# Patient Record
Sex: MALE | Race: ASIAN | NOT HISPANIC OR LATINO | Employment: FULL TIME | URBAN - METROPOLITAN AREA
[De-identification: names, ages, dates, MRNs, and addresses within clinical notes are randomized per-mention and may not be internally consistent; named-entity substitution may affect disease eponyms.]

---

## 2018-08-28 ENCOUNTER — HOSPITAL ENCOUNTER (OUTPATIENT)
Dept: RADIOLOGY | Facility: HOSPITAL | Age: 53
Discharge: HOME | End: 2018-08-28
Attending: INTERNAL MEDICINE
Payer: COMMERCIAL

## 2018-08-28 DIAGNOSIS — R52 PAIN: Primary | ICD-10-CM

## 2018-08-28 DIAGNOSIS — M54.50 LOW BACK PAIN: ICD-10-CM

## 2018-08-28 DIAGNOSIS — R52 PAIN: ICD-10-CM

## 2018-08-28 PROCEDURE — 72040 X-RAY EXAM NECK SPINE 2-3 VW: CPT | Mod: TC,FY

## 2018-08-28 PROCEDURE — 72040 X-RAY EXAM NECK SPINE 2-3 VW: CPT | Mod: 26,,, | Performed by: RADIOLOGY

## 2018-08-28 PROCEDURE — 72100 X-RAY EXAM L-S SPINE 2/3 VWS: CPT | Mod: 26,,, | Performed by: RADIOLOGY

## 2018-08-28 PROCEDURE — 72100 X-RAY EXAM L-S SPINE 2/3 VWS: CPT | Mod: TC,FY

## 2024-11-14 ENCOUNTER — HOSPITAL ENCOUNTER (EMERGENCY)
Facility: HOSPITAL | Age: 59
Discharge: HOME | End: 2024-11-14
Attending: EMERGENCY MEDICINE
Payer: COMMERCIAL

## 2024-11-14 VITALS
TEMPERATURE: 98 F | HEART RATE: 62 BPM | BODY MASS INDEX: 28.32 KG/M2 | RESPIRATION RATE: 20 BRPM | DIASTOLIC BLOOD PRESSURE: 91 MMHG | HEIGHT: 65 IN | OXYGEN SATURATION: 99 % | WEIGHT: 170 LBS | SYSTOLIC BLOOD PRESSURE: 164 MMHG

## 2024-11-14 DIAGNOSIS — D73.5 SPLENIC INFARCT: ICD-10-CM

## 2024-11-14 DIAGNOSIS — R73.9 HYPERGLYCEMIA: Primary | ICD-10-CM

## 2024-11-14 DIAGNOSIS — R11.0 NAUSEA: ICD-10-CM

## 2024-11-14 LAB
ALBUMIN SERPL BCP-MCNC: 4.4 G/DL (ref 3.5–5.2)
ALP SERPL-CCNC: 75 U/L (ref 40–150)
ALT SERPL W/O P-5'-P-CCNC: 34 U/L (ref 10–44)
ANION GAP SERPL CALC-SCNC: 15 MMOL/L (ref 8–16)
AST SERPL-CCNC: 19 U/L (ref 10–40)
B-OH-BUTYR BLD STRIP-SCNC: 0.6 MMOL/L (ref 0–0.5)
BACTERIA #/AREA URNS HPF: NORMAL /HPF
BASOPHILS # BLD AUTO: 0.05 K/UL (ref 0–0.2)
BASOPHILS NFR BLD: 0.8 % (ref 0–1.9)
BILIRUB SERPL-MCNC: 0.6 MG/DL (ref 0.1–1)
BILIRUB UR QL STRIP: NEGATIVE
BUN SERPL-MCNC: 20 MG/DL (ref 6–20)
CALCIUM SERPL-MCNC: 9.8 MG/DL (ref 8.7–10.5)
CHLORIDE SERPL-SCNC: 98 MMOL/L (ref 95–110)
CLARITY UR: CLEAR
CO2 SERPL-SCNC: 24 MMOL/L (ref 23–29)
COLOR UR: COLORLESS
CREAT SERPL-MCNC: 1.2 MG/DL (ref 0.5–1.4)
DIFFERENTIAL METHOD BLD: NORMAL
EOSINOPHIL # BLD AUTO: 0.1 K/UL (ref 0–0.5)
EOSINOPHIL NFR BLD: 1.8 % (ref 0–8)
ERYTHROCYTE [DISTWIDTH] IN BLOOD BY AUTOMATED COUNT: 12.8 % (ref 11.5–14.5)
EST. GFR  (NO RACE VARIABLE): >60 ML/MIN/1.73 M^2
GLUCOSE SERPL-MCNC: 413 MG/DL (ref 70–110)
GLUCOSE UR QL STRIP: ABNORMAL
HCT VFR BLD AUTO: 49.8 % (ref 40–54)
HGB BLD-MCNC: 17.2 G/DL (ref 14–18)
HGB UR QL STRIP: NEGATIVE
IMM GRANULOCYTES # BLD AUTO: 0.03 K/UL (ref 0–0.04)
IMM GRANULOCYTES NFR BLD AUTO: 0.5 % (ref 0–0.5)
KETONES UR QL STRIP: ABNORMAL
LEUKOCYTE ESTERASE UR QL STRIP: NEGATIVE
LIPASE SERPL-CCNC: 42 U/L (ref 4–60)
LYMPHOCYTES # BLD AUTO: 2.7 K/UL (ref 1–4.8)
LYMPHOCYTES NFR BLD: 44.8 % (ref 18–48)
MCH RBC QN AUTO: 29.6 PG (ref 27–31)
MCHC RBC AUTO-ENTMCNC: 34.5 G/DL (ref 32–36)
MCV RBC AUTO: 86 FL (ref 82–98)
MICROSCOPIC COMMENT: NORMAL
MONOCYTES # BLD AUTO: 0.4 K/UL (ref 0.3–1)
MONOCYTES NFR BLD: 7.1 % (ref 4–15)
NEUTROPHILS # BLD AUTO: 2.7 K/UL (ref 1.8–7.7)
NEUTROPHILS NFR BLD: 45 % (ref 38–73)
NITRITE UR QL STRIP: NEGATIVE
NRBC BLD-RTO: 0 /100 WBC
PH UR STRIP: 5 [PH] (ref 5–8)
PLATELET # BLD AUTO: 187 K/UL (ref 150–450)
PMV BLD AUTO: 10.7 FL (ref 9.2–12.9)
POCT GLUCOSE: 363 MG/DL (ref 70–110)
POTASSIUM SERPL-SCNC: 3.8 MMOL/L (ref 3.5–5.1)
PROT SERPL-MCNC: 7.9 G/DL (ref 6–8.4)
PROT UR QL STRIP: ABNORMAL
RBC # BLD AUTO: 5.82 M/UL (ref 4.6–6.2)
SODIUM SERPL-SCNC: 137 MMOL/L (ref 136–145)
SP GR UR STRIP: >1.03 (ref 1–1.03)
URN SPEC COLLECT METH UR: ABNORMAL
UROBILINOGEN UR STRIP-ACNC: NEGATIVE EU/DL
WBC # BLD AUTO: 6.07 K/UL (ref 3.9–12.7)
YEAST URNS QL MICRO: NORMAL

## 2024-11-14 PROCEDURE — 80053 COMPREHEN METABOLIC PANEL: CPT | Performed by: PHYSICIAN ASSISTANT

## 2024-11-14 PROCEDURE — 83690 ASSAY OF LIPASE: CPT | Performed by: PHYSICIAN ASSISTANT

## 2024-11-14 PROCEDURE — 82010 KETONE BODYS QUAN: CPT | Performed by: PHYSICIAN ASSISTANT

## 2024-11-14 PROCEDURE — 96360 HYDRATION IV INFUSION INIT: CPT

## 2024-11-14 PROCEDURE — 82962 GLUCOSE BLOOD TEST: CPT

## 2024-11-14 PROCEDURE — 81000 URINALYSIS NONAUTO W/SCOPE: CPT | Performed by: PHYSICIAN ASSISTANT

## 2024-11-14 PROCEDURE — 25500020 PHARM REV CODE 255: Performed by: EMERGENCY MEDICINE

## 2024-11-14 PROCEDURE — 25000003 PHARM REV CODE 250: Performed by: PHYSICIAN ASSISTANT

## 2024-11-14 PROCEDURE — 99285 EMERGENCY DEPT VISIT HI MDM: CPT | Mod: 25

## 2024-11-14 PROCEDURE — 96361 HYDRATE IV INFUSION ADD-ON: CPT

## 2024-11-14 PROCEDURE — 85025 COMPLETE CBC W/AUTO DIFF WBC: CPT | Performed by: PHYSICIAN ASSISTANT

## 2024-11-14 RX ORDER — RAMIPRIL 10 MG/1
10 CAPSULE ORAL EVERY MORNING
COMMUNITY
Start: 2024-08-02 | End: 2024-11-14

## 2024-11-14 RX ORDER — RAMIPRIL 10 MG/1
10 CAPSULE ORAL DAILY
Qty: 30 CAPSULE | Refills: 0 | Status: SHIPPED | OUTPATIENT
Start: 2024-11-14 | End: 2024-11-19

## 2024-11-14 RX ORDER — METFORMIN HYDROCHLORIDE 500 MG/1
500 TABLET ORAL 2 TIMES DAILY WITH MEALS
Qty: 30 TABLET | Refills: 0 | Status: SHIPPED | OUTPATIENT
Start: 2024-11-14 | End: 2025-11-14

## 2024-11-14 RX ORDER — ONDANSETRON 4 MG/1
4 TABLET, ORALLY DISINTEGRATING ORAL EVERY 6 HOURS PRN
Qty: 30 TABLET | Refills: 0 | Status: SHIPPED | OUTPATIENT
Start: 2024-11-14

## 2024-11-14 RX ADMIN — SODIUM CHLORIDE 1000 ML: 9 INJECTION, SOLUTION INTRAVENOUS at 11:11

## 2024-11-14 RX ADMIN — IOHEXOL 75 ML: 350 INJECTION, SOLUTION INTRAVENOUS at 11:11

## 2024-11-14 NOTE — CONSULTS
[facility] - Emergency Dept  General Surgery  Consult Note    Patient Name: Kris Cordon  MRN: [id number]  Code Status: No Order  Admission Date: 11/14/2024  Hospital Length of Stay: 0 days  Attending Physician: Chaim Patel,*  Primary Care Provider: Willian Purvis MD    Patient information was obtained from patient, past medical records, and ER records.     Consults  Subjective:     Principal Problem: Splenic infarct    History of Present Illness: 59M with hx of DM presents with abdominal pain that started while driving this am. One episode of vomiting but no nausea or vomiting currently. Pain has improved but still persisted. Last BM yesterday. No history of trauma. Work-up revealed normal labs with the exception of a glucose of >400. CT showed areas of splenic infarction. Pt denies history of blood clots. Surgery consulted.     No current facility-administered medications on file prior to encounter.     Current Outpatient Medications on File Prior to Encounter   Medication Sig    ramipriL (ALTACE) 10 MG capsule Take 10 mg by mouth every morning.       Review of patient's allergies indicates:   Allergen Reactions    Ibuprofen        Past Medical History:   Diagnosis Date    Hypertension      History reviewed. No pertinent surgical history.  Family History    None       Tobacco Use    Smoking status: Never    Smokeless tobacco: Never   Substance and Sexual Activity    Alcohol use: Not on file    Drug use: Not on file    Sexual activity: Not on file     Review of Systems   Gastrointestinal:  Positive for abdominal pain and vomiting.     Objective:     Vital Signs (Most Recent):  Temp: 97.5 °F (36.4 °C) (11/14/24 0828)  Pulse: 62 (11/14/24 1232)  Resp: 20 (11/14/24 1002)  BP: (!) 164/91 (11/14/24 1232)  SpO2: 99 % (11/14/24 1232) Vital Signs (24h Range):  Temp:  [97.5 °F (36.4 °C)] 97.5 °F (36.4 °C)  Pulse:  [60-64] 62  Resp:  [20] 20  SpO2:  [97 %-100 %] 99 %  BP: (148-186)/(72-91) 164/91     Weight:  77.1 kg (170 lb)  Body mass index is 28.29 kg/m².     Physical Exam  Vitals reviewed.   Constitutional:       General: He is not in acute distress.  HENT:      Head: Normocephalic and atraumatic.      Nose: Nose normal.   Eyes:      General:         Right eye: No discharge.         Left eye: No discharge.   Cardiovascular:      Rate and Rhythm: Normal rate and regular rhythm.   Pulmonary:      Effort: Pulmonary effort is normal. No respiratory distress.      Breath sounds: No stridor.   Abdominal:      Comments: Soft, ND, minimal tenderness to palpation    Musculoskeletal:         General: Normal range of motion.      Cervical back: Normal range of motion.   Skin:     General: Skin is warm and dry.      Capillary Refill: Capillary refill takes 2 to 3 seconds.   Neurological:      General: No focal deficit present.      Mental Status: He is alert and oriented to person, place, and time.   Psychiatric:         Mood and Affect: Mood normal.         Behavior: Behavior normal.            I have reviewed all pertinent lab results within the past 24 hours.    Significant Diagnostics:  I have reviewed all pertinent imaging results/findings within the past 24 hours.    Assessment/Plan:     * Splenic infarct  59M with abdominal pain and found to have splenic infarct on CT. Glucose >400.     No role for surgery here  Unclear cause of splenic infarction. Would have him work-up further. PCP vs Hematology.   Glucose > 400. Needs to be set up with primary care to have better control. Discussed this with pt.  Please call with further questions.   Disposition per ED        VTE Risk Mitigation (From admission, onward)      None            Thank you for your consult. I will sign off. Please contact us if you have any additional questions.    David Myles MD  General Surgery  [facility] - Emergency Dept

## 2024-11-14 NOTE — ASSESSMENT & PLAN NOTE
59M with abdominal pain and found to have splenic infarct on CT. Glucose >400.     No role for surgery here  Unclear cause of splenic infarction. Would have him work-up further. PCP vs Hematology.   Glucose > 400. Needs to be set up with primary care to have better control. Discussed this with pt.  Please call with further questions.   Disposition per ED

## 2024-11-14 NOTE — HPI
59M with hx of DM presents with abdominal pain that started while driving this am. One episode of vomiting but no nausea or vomiting currently. Pain has improved but still persisted. Last BM yesterday. No history of trauma. Work-up revealed normal labs with the exception of a glucose of >400. CT showed areas of splenic infarction. Surgery consulted.

## 2024-11-14 NOTE — ED NOTES
Pt instructed to remove his pants and belt for CT imaging.  Pt doing so at this time.  Pt requesting water at this time as well.   Kenneth ANTUNEZ aware

## 2024-11-14 NOTE — ED NOTES
Pt provided with discharge paperwork and follow-up instructions.  All questions and concerns addressed at this time.  Pt encouraged to return to the ED with any new or worsening symptoms.  Pt ambulatory off of the unit with a steady gait and in NAD.

## 2024-11-14 NOTE — ED PROVIDER NOTES
Encounter Date: 11/14/2024       History     Chief Complaint   Patient presents with    Abdominal Pain     Patient reports to the ED complaining of sudden onset abdominal pain. Patient endorses nausea, emesis. Patient ambulatory, appears uncomfortable.     59-year-old male, PMHx HTN, presents to ED with concern of sudden onset generalized abdominal pain that began while driving to work roughly 1 hour prior to arrival.  He did have associated nausea but no vomiting.  He does admit his abdominal pain has gradually improved and he was no longer feeling nauseous.  Current abdominal pain is described as generalized, nonradiating, severely 8/10.  No fevers, chills, chest pain, cough, shortness of breath, urinary or bowel complaints.  No previous abdominal surgeries.  Unsure when last bowel movement was but denies constipation.  No other acute complaints at this time    The history is provided by the patient.     Review of patient's allergies indicates:   Allergen Reactions    Ibuprofen      Past Medical History:   Diagnosis Date    Hypertension      History reviewed. No pertinent surgical history.  No family history on file.  Social History     Tobacco Use    Smoking status: Never    Smokeless tobacco: Never     Review of Systems   Constitutional:  Negative for chills and fever.   HENT:  Negative for congestion, ear pain and sore throat.    Respiratory:  Negative for cough and shortness of breath.    Cardiovascular:  Negative for chest pain.   Gastrointestinal:  Positive for abdominal pain and nausea. Negative for diarrhea and vomiting.   Genitourinary:  Negative for dysuria, flank pain and frequency.   Musculoskeletal:  Negative for back pain, neck pain and neck stiffness.       Physical Exam     Initial Vitals [11/14/24 0828]   BP Pulse Resp Temp SpO2   (!) 181/84 64 20 97.5 °F (36.4 °C) 100 %      MAP       --         Physical Exam    Nursing note and vitals reviewed.  Constitutional: He appears well-developed and  well-nourished. He is cooperative. He does not have a sickly appearance. He does not appear ill. No distress.   Resting on exam, no apparent distress   HENT:   Head: Normocephalic and atraumatic.   Eyes: EOM are normal.   Neck: Neck supple.   Normal range of motion.  Cardiovascular:  Normal rate, regular rhythm and normal heart sounds.           Pulmonary/Chest: Effort normal and breath sounds normal.   Abdominal: Abdomen is soft. There is generalized abdominal tenderness.   Minimally reproducible generalized abdominal pain.  Patient denying specific region of pinpoint tenderness on exam.  No skin changes.  Bowel sounds are intact.   Musculoskeletal:         General: No tenderness.      Cervical back: Normal range of motion and neck supple.     Neurological: He is alert and oriented to person, place, and time. GCS score is 15. GCS eye subscore is 4. GCS verbal subscore is 5. GCS motor subscore is 6.   Skin: Skin is warm and dry.   Psychiatric: He has a normal mood and affect. His speech is normal and behavior is normal. Thought content normal.         ED Course   Procedures  Labs Reviewed   COMPREHENSIVE METABOLIC PANEL - Abnormal       Result Value    Sodium 137      Potassium 3.8      Chloride 98      CO2 24      Glucose 413 (*)     BUN 20      Creatinine 1.2      Calcium 9.8      Total Protein 7.9      Albumin 4.4      Total Bilirubin 0.6      Alkaline Phosphatase 75      AST 19      ALT 34      eGFR >60      Anion Gap 15     URINALYSIS, REFLEX TO URINE CULTURE - Abnormal    Specimen UA Urine, Clean Catch      Color, UA Colorless (*)     Appearance, UA Clear      pH, UA 5.0      Specific Gravity, UA >1.030 (*)     Protein, UA Trace (*)     Glucose, UA 4+ (*)     Ketones, UA 1+ (*)     Bilirubin (UA) Negative      Occult Blood UA Negative      Nitrite, UA Negative      Urobilinogen, UA Negative      Leukocytes, UA Negative      Narrative:     Specimen Source->Urine   BETA - HYDROXYBUTYRATE, SERUM - Abnormal     Beta-Hydroxybutyrate 0.6 (*)    POCT GLUCOSE - Abnormal    POCT Glucose 363 (*)    CBC W/ AUTO DIFFERENTIAL    WBC 6.07      RBC 5.82      Hemoglobin 17.2      Hematocrit 49.8      MCV 86      MCH 29.6      MCHC 34.5      RDW 12.8      Platelets 187      MPV 10.7      Immature Granulocytes 0.5      Gran # (ANC) 2.7      Immature Grans (Abs) 0.03      Lymph # 2.7      Mono # 0.4      Eos # 0.1      Baso # 0.05      nRBC 0      Gran % 45.0      Lymph % 44.8      Mono % 7.1      Eosinophil % 1.8      Basophil % 0.8      Differential Method Automated     LIPASE    Lipase 42     URINALYSIS MICROSCOPIC    Bacteria Rare      Yeast, UA None      Microscopic Comment SEE COMMENT      Narrative:     Specimen Source->Urine   POCT GLUCOSE MONITORING CONTINUOUS          Imaging Results              CT Abdomen Pelvis With IV Contrast NO Oral Contrast (Final result)  Result time 11/14/24 11:38:33      Final result by Doug Hale III, MD (11/14/24 11:38:33)                   Impression:      Multiple geographic areas of splenic hypoperfusion suggesting splenic infarct.  Trauma may be less likely.  Neoplasm is unlikely.    Coronary artery calcifications.  Hand      Electronically signed by: Doug Hale MD  Date:    11/14/2024  Time:    11:38               Narrative:    EXAMINATION:  CT ABDOMEN PELVIS WITH IV CONTRAST    CLINICAL HISTORY:  Abdominal pain, acute, nonlocalized;    FINDINGS:  Patient was administered 75 cc of Omnipaque 350 intravenously.  Lung bases are clear.  There are coronary calcifications.  There is mild fatty change of the liver.  Gallbladder and biliary tree show nothing unusual.  Spleen demonstrates abnormal areas of hypoperfusion possibly related to splenic infarct versus trauma.  The stomach, the pancreas, duodenum show nothing unusual.  The adrenal glands are not enlarged.  The kidneys demonstrate no mass, scar, stone, or hydronephrosis.  The vessels enhance normally.  No para-aortic,  retroperitoneal, or mesenteric adenopathy is seen.  The appendix and bowel loops are normal.  No obstruction, ileus, perforation, abscess, or ascites is seen.  The pelvic organs are unremarkable.                                       Medications   sodium chloride 0.9% bolus 1,000 mL 1,000 mL (0 mLs Intravenous Stopped 11/14/24 1329)   iohexoL (OMNIPAQUE 350) injection 75 mL (75 mLs Intravenous Given 11/14/24 1127)     Medical Decision Making  Patient presents with concern of sudden onset generalized abdominal pain with nausea that began just prior to arrival.  Symptoms have gradually improved.  Afebrile.  Patient in no distress on exam    DDx:  Including but not limited to GERD, intestinal spasm, gastroenteritis, gastritis, ulcer, cholecystitis, cholelithiasis, gallstones, pancreatitis, ileus, small bowel obstruction, appendicitis, diverticulitis, colitis, constipation, intestinal gas pain, hyperglycemia, DKA, HHS        Amount and/or Complexity of Data Reviewed  Labs: ordered. Decision-making details documented in ED Course.  Radiology: ordered.               ED Course as of 11/14/24 1543   Thu Nov 14, 2024   0920 CBC auto differential  No elevation WBC [KS]   0937 Comprehensive metabolic panel(!)  Hyperglycemia 413.  Electrolytes WNL.  Creatinine WNL.  No anion gap. [KS]   0937 Lipase  WNL [KS]   1040 Beta - Hydroxybutyrate, Serum(!)  0.6 [KS]   1228 Patient receiving IV fluids for hyperglycemia.  Beta hydroxy is minimally elevated with no anion gap.  I do have lower suspicion for DKA. [KS]   1229 CT Abdomen Pelvis With IV Contrast NO Oral Contrast  CT abdomen pelvis per Radiology review:    Multiple geographic areas of splenic hypoperfusion suggesting splenic infarct.  Trauma may be less likely.  Neoplasm is unlikely.   [KS]   1229 Patient and CT results were discussed with general surgery resident, Dr. Myles, who states he will review imaging and discuss plan [KS]   1437 No need for emergent surgical  intervention per General surgery team. [KS]   1438 POCT glucose(!)  Hyperglycemia improving with IV fluids.  Patient continues to rest comfortably. [KS]   1438 Patient discussed with [facility] family medicine team in effort to assist with close outpatient follow-up for his new onset diabetes and CT findings concerning for splenic infarct. [KS]   1536 [facility] family medicine team has assisted with arranging outpatient follow-up.  Patient does have clinic follow-up on 11/19 at 8:00 a.m..  Prescription was written for Zofran for nausea control, along with prescription for metformin for his hyperglycemia.  Patient also requesting refill of his home ramipril.  Encouraged hydration, dietary changes and close outpatient follow-up.  ED return precautions were discussed at length.  Patient states understanding and agrees with plan [KS]      ED Course User Index  [KS] Kenneth Howard PA-C                             Clinical Impression:  Final diagnoses:  [R73.9] Hyperglycemia (Primary)  [R11.0] Nausea  [D73.5] Splenic infarct          ED Disposition Condition    Discharge Stable          ED Prescriptions       Medication Sig Dispense Start Date End Date Auth. Provider    metFORMIN (GLUCOPHAGE) 500 MG tablet Take 1 tablet (500 mg total) by mouth 2 (two) times daily with meals. 30 tablet 11/14/2024 11/14/2025 Kenneth Howard PA-C    ondansetron (ZOFRAN-ODT) 4 MG TbDL Take 1 tablet (4 mg total) by mouth every 6 (six) hours as needed (nausea). 30 tablet 11/14/2024 -- Kenneth Howard PA-C    ramipriL (ALTACE) 10 MG capsule Take 1 capsule (10 mg total) by mouth once daily. 30 capsule 11/14/2024 12/14/2024 Kenneth Howard PA-C          Follow-up Information       Follow up With Specialties Details Why Contact Info Additional Information    [place] [facility] Family Medicine On 11/19/2024 8:00 a.m. [address]  [address]  [place] [address] [phone] Please park in [address] or [address] and use [address]ie [address]. Take  [address] elevators.             Kenneth Howard PA-C  11/14/24 1545

## 2024-11-14 NOTE — DISCHARGE INSTRUCTIONS
You have an appointment with the [facility] family medicine team at 8:00 a.m. on 11/19/2024.  Began taking your metformin as instructed with lots of fluids and close outpatient follow-up.  Return emergency room for any new or worsening symptoms    Thank you for letting myself and our team take care for you today! It was nice meeting you, and I hope you feel better very soon. Please come back to [facility] for all of your future medical needs.   Our goal in the ER is to always give you outstanding care and exceptional service. You may receive a survey by mail or email in the next week about your experience in our ED. We would greatly appreciate you completing and returning the survey. Your feedback provides us with a way to recognize our staff who give very good care and it helps us learn how to improve when your experience was below our aspiration of excellence.     Sincerely,     Kenneth Howard PA-C  Emergency Room Physician Assistant  [facility]

## 2024-11-14 NOTE — ED NOTES
Pt endorsing taking his HTN medication this AM.  Pt denying nausea at this time as well.  Generalized mid-abdominal pain

## 2024-11-14 NOTE — SUBJECTIVE & OBJECTIVE
No current facility-administered medications on file prior to encounter.     Current Outpatient Medications on File Prior to Encounter   Medication Sig    ramipriL (ALTACE) 10 MG capsule Take 10 mg by mouth every morning.       Review of patient's allergies indicates:   Allergen Reactions    Ibuprofen        Past Medical History:   Diagnosis Date    Hypertension      History reviewed. No pertinent surgical history.  Family History    None       Tobacco Use    Smoking status: Never    Smokeless tobacco: Never   Substance and Sexual Activity    Alcohol use: Not on file    Drug use: Not on file    Sexual activity: Not on file     Review of Systems   Gastrointestinal:  Positive for abdominal pain and vomiting.     Objective:     Vital Signs (Most Recent):  Temp: 97.5 °F (36.4 °C) (11/14/24 0828)  Pulse: 62 (11/14/24 1232)  Resp: 20 (11/14/24 1002)  BP: (!) 164/91 (11/14/24 1232)  SpO2: 99 % (11/14/24 1232) Vital Signs (24h Range):  Temp:  [97.5 °F (36.4 °C)] 97.5 °F (36.4 °C)  Pulse:  [60-64] 62  Resp:  [20] 20  SpO2:  [97 %-100 %] 99 %  BP: (148-186)/(72-91) 164/91     Weight: 77.1 kg (170 lb)  Body mass index is 28.29 kg/m².     Physical Exam  Vitals reviewed.   Constitutional:       General: He is not in acute distress.  HENT:      Head: Normocephalic and atraumatic.      Nose: Nose normal.   Eyes:      General:         Right eye: No discharge.         Left eye: No discharge.   Cardiovascular:      Rate and Rhythm: Normal rate and regular rhythm.   Pulmonary:      Effort: Pulmonary effort is normal. No respiratory distress.      Breath sounds: No stridor.   Abdominal:      Comments: Soft, ND, minimal tenderness to palpation    Musculoskeletal:         General: Normal range of motion.      Cervical back: Normal range of motion.   Skin:     General: Skin is warm and dry.      Capillary Refill: Capillary refill takes 2 to 3 seconds.   Neurological:      General: No focal deficit present.      Mental Status: He is alert  and oriented to person, place, and time.   Psychiatric:         Mood and Affect: Mood normal.         Behavior: Behavior normal.            I have reviewed all pertinent lab results within the past 24 hours.    Significant Diagnostics:  I have reviewed all pertinent imaging results/findings within the past 24 hours.

## 2024-11-19 ENCOUNTER — CLINICAL SUPPORT (OUTPATIENT)
Dept: LAB | Facility: HOSPITAL | Age: 59
End: 2024-11-19
Payer: COMMERCIAL

## 2024-11-19 ENCOUNTER — OFFICE VISIT (OUTPATIENT)
Dept: FAMILY MEDICINE | Facility: HOSPITAL | Age: 59
End: 2024-11-19
Payer: COMMERCIAL

## 2024-11-19 VITALS
BODY MASS INDEX: 25.49 KG/M2 | OXYGEN SATURATION: 100 % | DIASTOLIC BLOOD PRESSURE: 107 MMHG | RESPIRATION RATE: 18 BRPM | HEART RATE: 80 BPM | HEIGHT: 65 IN | WEIGHT: 153 LBS | SYSTOLIC BLOOD PRESSURE: 168 MMHG

## 2024-11-19 DIAGNOSIS — Z12.11 ENCOUNTER FOR SCREENING FOR MALIGNANT NEOPLASM OF COLON: ICD-10-CM

## 2024-11-19 DIAGNOSIS — Z76.89 ENCOUNTER TO ESTABLISH CARE: ICD-10-CM

## 2024-11-19 DIAGNOSIS — I10 HYPERTENSION, UNSPECIFIED TYPE: ICD-10-CM

## 2024-11-19 DIAGNOSIS — R73.9 HYPERGLYCEMIA: Primary | ICD-10-CM

## 2024-11-19 DIAGNOSIS — D73.5 SPLENIC INFARCT: ICD-10-CM

## 2024-11-19 DIAGNOSIS — Z12.5 ENCOUNTER FOR PROSTATE CANCER SCREENING: ICD-10-CM

## 2024-11-19 DIAGNOSIS — Z11.4 ENCOUNTER FOR SCREENING FOR HIV: ICD-10-CM

## 2024-11-19 DIAGNOSIS — Z11.59 NEED FOR HEPATITIS C SCREENING TEST: ICD-10-CM

## 2024-11-19 LAB — GLUCOSE SERPL-MCNC: 235 MG/DL (ref 70–110)

## 2024-11-19 PROCEDURE — 93010 ELECTROCARDIOGRAM REPORT: CPT | Mod: ,,, | Performed by: INTERNAL MEDICINE

## 2024-11-19 PROCEDURE — 99215 OFFICE O/P EST HI 40 MIN: CPT

## 2024-11-19 PROCEDURE — 93005 ELECTROCARDIOGRAM TRACING: CPT

## 2024-11-19 PROCEDURE — 82962 GLUCOSE BLOOD TEST: CPT

## 2024-11-19 RX ORDER — AMLODIPINE BESYLATE 5 MG/1
5 TABLET ORAL DAILY
Qty: 90 TABLET | Refills: 3 | Status: SHIPPED | OUTPATIENT
Start: 2024-11-19 | End: 2025-11-19

## 2024-11-19 RX ORDER — ATORVASTATIN CALCIUM 40 MG/1
40 TABLET, FILM COATED ORAL DAILY
Qty: 90 TABLET | Refills: 3 | Status: SHIPPED | OUTPATIENT
Start: 2024-11-19 | End: 2025-11-19

## 2024-11-19 RX ORDER — OLMESARTAN MEDOXOMIL 20 MG/1
20 TABLET ORAL DAILY
Qty: 90 TABLET | Refills: 3 | Status: SHIPPED | OUTPATIENT
Start: 2024-11-19 | End: 2025-11-19

## 2024-11-19 RX ORDER — AMLODIPINE AND OLMESARTAN MEDOXOMIL 5; 20 MG/1; MG/1
1 TABLET ORAL DAILY
Qty: 90 TABLET | Refills: 3 | Status: CANCELLED | OUTPATIENT
Start: 2024-11-19 | End: 2025-11-19

## 2024-11-19 NOTE — PROGRESS NOTES
"[facility]  History & Physical    SUBJECTIVE:     Chief Complaint:   Chief Complaint   Patient presents with    Hospital Follow Up       History of Present Illness:  59 y.o. male presents to clinic today for establishing care. He was recently seen in ED for abdominal pain and diagnosed with splenic infarct, diabetes, HTN. Seen by surgery in ED without acute intervention. Patient discharged for outpatient follow up with metfomrin, ramipril.     #Splenic infarct  Today he reports resolution of his abdominal pain. Does endorse fatigue, weakness. Denies CP, SOB, palpitations during his pain event last week. He denies history of blood clots, heart disease, arrhythmia, recent illness, smoking. He reports family history of HTN, DM and sibling that  of "aneurysm". Denies family history of clotting disorder, arrhythmia, malignancy. He is from the [facility] and his family is located there.     #HTN  - newly diagnosed, uncontrolled on ramipril    #DM2  -newly diagnosed with glucose in 400s. Given metformin 500 mg BID. No side effects. POCT glucose 230s today in clinic.       Active Problem List with Overview Notes    Diagnosis Date Noted    Splenic infarct 2024          Allergies:  Review of patient's allergies indicates:   Allergen Reactions    Ibuprofen        Home Medications:  Current Outpatient Medications on File Prior to Visit   Medication Sig    metFORMIN (GLUCOPHAGE) 500 MG tablet Take 1 tablet (500 mg total) by mouth 2 (two) times daily with meals.    ondansetron (ZOFRAN-ODT) 4 MG TbDL Take 1 tablet (4 mg total) by mouth every 6 (six) hours as needed (nausea).    [DISCONTINUED] ramipriL (ALTACE) 10 MG capsule Take 1 capsule (10 mg total) by mouth once daily.     No current facility-administered medications on file prior to visit.       Past Medical History:   Diagnosis Date    Hypertension      No past surgical history on file.  No family history on file.  Social History     Tobacco Use    " Smoking status: Never    Smokeless tobacco: Never        Review of Systems   All other systems reviewed and are negative.       OBJECTIVE:     Vital Signs:  Pulse: 80 (11/19/24 0756)  Resp: 18 (11/19/24 0756)  BP: (!) 168/107 (11/19/24 0756)  SpO2: 100 % (11/19/24 0756)    Physical Exam  Vitals and nursing note reviewed.   Constitutional:       Appearance: Normal appearance.   HENT:      Head: Atraumatic.   Cardiovascular:      Rate and Rhythm: Normal rate and regular rhythm.      Pulses: Normal pulses.      Heart sounds: Normal heart sounds.   Pulmonary:      Effort: Pulmonary effort is normal.      Breath sounds: Normal breath sounds.   Abdominal:      General: There is no distension.      Tenderness: There is no abdominal tenderness.   Neurological:      Mental Status: He is alert and oriented to person, place, and time.         A/P:  Kris was seen today for hospital follow up.    Diagnoses and all orders for this visit:    Hyperglycemia  - newly diagnosed diabetes likely, will check A1c. Start on statin. Will be on arb for BP  -     CBC Auto Differential; Future  -     Comprehensive Metabolic Panel; Future  -     Lipid Panel; Future  -     Hemoglobin A1C; Future  -     Ambulatory referral/consult to Ophthalmology; Future  -     POCT Glucose, Hand-Held Device  -     Urinalysis; Future  -     atorvastatin (LIPITOR) 40 MG tablet; Take 1 tablet (40 mg total) by mouth once daily.    Hypertension, unspecified type  - newly diagnosed, uncontrolled, Stop ramipril. Start amlodipine, olmesartan. Will check EKG, holter, ECHO given hypertension, splenic infarct  -     Lipid Panel; Future  -     Hemoglobin A1C; Future  -     TSH; Future  -     Ambulatory referral/consult to Hematology / Oncology; Future  -     Echo; Future  -     EKG 12-lead; Future  -     Holter monitor - 48 hour; Future  -     Urinalysis; Future  -     amLODIPine (NORVASC) 5 MG tablet; Take 1 tablet (5 mg total) by mouth once daily.  -     olmesartan  (BENICAR) 20 MG tablet; Take 1 tablet (20 mg total) by mouth once daily.    Splenic infarct  - unknown etiology, possibly due to polycythemia as Hgb > 17. No personal or family history of heart disease, malignancy, clotting disorder. Workup as below and will refer to hematology. Will defer anticoagulation as patient otherwise asymptomatic at this time   -     CBC Auto Differential; Future  -     Comprehensive Metabolic Panel; Future  -     TSH; Future  -     Ambulatory referral/consult to Hematology / Oncology; Future  -     Echo; Future  -     EKG 12-lead; Future  -     Holter monitor - 48 hour; Future  -     Peripheral Smear Review for Hemolysis or Low Platelets; Future  -     LURDES Screen w/Reflex; Future  -     Cardiolipin antibody, IgM; Future  -     Cardiolipin antibody; Future  -     Cardiolipin antibody, IgA; Future  -     Antithrombin III; Future  -     Protein C activity; Future  -     Protein S activity; Future  -     DRVVT; Future  -     Homocysteine, serum; Future  -     Factor 5 leiden; Future  -     Prothrombin gene mutation; Future  -     Beta-2 glycoprotein antibodies; Future  -     Phosphatidylserine Ab (IgA,IgG,IgM); Future  -     atorvastatin (LIPITOR) 40 MG tablet; Take 1 tablet (40 mg total) by mouth once daily.  -     Cardiolipin antibody, IgM    Encounter for screening for malignant neoplasm of colon  - asymptomatic   -     Ambulatory referral/consult to Endo Procedure ; Future    Encounter for screening for HIV  -     HIV 1/2 Ag/Ab (4th Gen); Future    Need for hepatitis C screening test  -     Hepatitis C Antibody; Future    Encounter for prostate cancer screening  - no family history, asymptomatic, patient wishes to defer PSA     Encounter to establish care    Follow up to discuss lab results later this week, follow up in one month for BP check DM2    Shakir Clark DO  [facility], PGY-3

## 2024-11-19 NOTE — PROGRESS NOTES
I have reviewed the notes, assessments, and/or procedures performed by Dr. Clark, I concur with her/his documentation of Kris Cordon.  Date of Service: 11/19/2024. New onset DM, uncontrolled DM. Unclear etiology for splenic infarct. Agree with plans.

## 2024-11-20 LAB
OHS QRS DURATION: 82 MS
OHS QTC CALCULATION: 423 MS

## 2024-11-22 ENCOUNTER — OFFICE VISIT (OUTPATIENT)
Dept: FAMILY MEDICINE | Facility: HOSPITAL | Age: 59
End: 2024-11-22
Payer: COMMERCIAL

## 2024-11-22 VITALS
BODY MASS INDEX: 25.49 KG/M2 | SYSTOLIC BLOOD PRESSURE: 133 MMHG | WEIGHT: 153 LBS | HEART RATE: 80 BPM | OXYGEN SATURATION: 100 % | HEIGHT: 65 IN | DIASTOLIC BLOOD PRESSURE: 85 MMHG

## 2024-11-22 DIAGNOSIS — E78.2 MIXED HYPERLIPIDEMIA: ICD-10-CM

## 2024-11-22 DIAGNOSIS — I10 HYPERTENSION, UNSPECIFIED TYPE: ICD-10-CM

## 2024-11-22 DIAGNOSIS — E11.65 UNCONTROLLED TYPE 2 DIABETES MELLITUS WITH HYPERGLYCEMIA: Primary | ICD-10-CM

## 2024-11-22 DIAGNOSIS — D73.5 SPLENIC INFARCT: ICD-10-CM

## 2024-11-22 DIAGNOSIS — Z12.11 ENCOUNTER FOR SCREENING FOR MALIGNANT NEOPLASM OF COLON: ICD-10-CM

## 2024-11-22 PROBLEM — E11.59 TYPE 2 DIABETES MELLITUS WITH CIRCULATORY DISORDER, WITHOUT LONG-TERM CURRENT USE OF INSULIN: Status: ACTIVE | Noted: 2024-11-22

## 2024-11-22 PROBLEM — Z79.4 TYPE 2 DIABETES MELLITUS WITH HYPERGLYCEMIA, WITH LONG-TERM CURRENT USE OF INSULIN: Status: ACTIVE | Noted: 2024-11-22

## 2024-11-22 PROBLEM — E78.5 HYPERLIPIDEMIA: Status: ACTIVE | Noted: 2024-11-22

## 2024-11-22 LAB — GLUCOSE SERPL-MCNC: 252 MG/DL (ref 70–110)

## 2024-11-22 PROCEDURE — 99214 OFFICE O/P EST MOD 30 MIN: CPT

## 2024-11-22 RX ORDER — LANCETS
EACH MISCELLANEOUS
Qty: 100 EACH | Refills: 5 | Status: SHIPPED | OUTPATIENT
Start: 2024-11-22

## 2024-11-22 RX ORDER — DEXTROSE 4 G
TABLET,CHEWABLE ORAL
Qty: 1 EACH | Refills: 0 | Status: SHIPPED | OUTPATIENT
Start: 2024-11-22

## 2024-11-22 RX ORDER — SEMAGLUTIDE 0.68 MG/ML
0.25 INJECTION, SOLUTION SUBCUTANEOUS
Qty: 3 ML | Refills: 0 | Status: SHIPPED | OUTPATIENT
Start: 2024-11-22

## 2024-11-22 NOTE — PROGRESS NOTES
"Progress Note  [facility]    Subjective:      Kris Cordon is a 59 y.o. male here for lab follow up from earlier this week.     #DM2  -A1c 11.8.   -Was started on metformin 500 mg in ED last week, increased to BID earlier this week.   -Feeling well, no GI issues  -has stopped drinking soda, has been working on healthier diet     #HTN  - improving with additonal of olmesartan 20 mg to amlodipine 5 mg    #HLD  - severely elevated, started on atorvastatin 40 mg daily earlier this week     #Splenic infarct  - workup thus far negative, awaiting scheduling with heme/onc    Active Problem List with Overview Notes    Diagnosis Date Noted    Hypertension 11/22/2024    Hyperlipidemia 11/22/2024    Type 2 diabetes mellitus with hyperglycemia, with long-term current use of insulin 11/22/2024    Splenic infarct 11/14/2024        Review of Systems   All other systems reviewed and are negative.        Objective:   /85 (BP Location: Right arm, Patient Position: Sitting)   Pulse 80   Ht 5' 5" (1.651 m)   Wt 69.4 kg (153 lb)   SpO2 100%   BMI 25.46 kg/m²      Physical Exam  Vitals and nursing note reviewed.   Constitutional:       Appearance: Normal appearance.   Cardiovascular:      Rate and Rhythm: Normal rate and regular rhythm.   Pulmonary:      Effort: Pulmonary effort is normal.      Breath sounds: Normal breath sounds.   Abdominal:      Comments: NO RUQ pain on deep palpation    Neurological:      Mental Status: He is alert and oriented to person, place, and time.   Psychiatric:         Mood and Affect: Mood normal.         Behavior: Behavior normal.          Assessment:   59 y.o. with        1. Uncontrolled type 2 diabetes mellitus with hyperglycemia    2. Mixed hyperlipidemia    3. Hypertension, unspecified type    4. Splenic infarct    5. Encounter for screening for malignant neoplasm of colon    6. Type 2 diabetes mellitus with hyperglycemia, with long-term current use of insulin         Plan: "   Kris was seen today for follow-up.    Diagnoses and all orders for this visit:    Uncontrolled type 2 diabetes mellitus with hyperglycemia  - will add jardiance and ozempic, no family hx of medullary thyroid cancer, MEN2. Discussed risk of increased GI side effects, if too expensive/ not covered, will start on long acting insulin. Refer to education. Meter ordered  -     POCT Glucose, Hand-Held Device  -     empagliflozin (JARDIANCE) 10 mg tablet; Take 1 tablet (10 mg total) by mouth once daily.  -     semaglutide (OZEMPIC) 0.25 mg or 0.5 mg (2 mg/3 mL) pen injector; Inject 0.25 mg into the skin every 7 days.  -     blood-glucose meter Misc; Use as directed  -     lancets Misc; To check BG 2 times daily, to use with insurance preferred meter  -     blood sugar diagnostic Strp; To check BG 2 times daily, to use with insurance preferred meter  -     Ambulatory referral/consult to Diabetes Education; Future    Mixed hyperlipidemia  - uncontrolled, recently started on statin     Hypertension, unspecified type  - improving with addition of olmesartan to amlodipine    Splenic infarct  - lab workup pending, no abdominal pain, needs appt with heme/onc    Encounter for screening for malignant neoplasm of colon  - scheduled for colonoscopy preop visit in 2 months       Follow up in about 4 weeks (around 12/20/2024). For BP check, DM2 follow up     Shakir Clark DO  [facility], PGY-3  8:44 AM, 11/22/2024

## 2024-12-11 ENCOUNTER — HOSPITAL ENCOUNTER (OUTPATIENT)
Dept: CARDIOLOGY | Facility: HOSPITAL | Age: 59
Discharge: HOME | End: 2024-12-11
Payer: COMMERCIAL

## 2024-12-11 VITALS — BODY MASS INDEX: 25.49 KG/M2 | HEIGHT: 65 IN | WEIGHT: 153 LBS

## 2024-12-11 DIAGNOSIS — D73.5 SPLENIC INFARCT: ICD-10-CM

## 2024-12-11 DIAGNOSIS — I10 HYPERTENSION, UNSPECIFIED TYPE: ICD-10-CM

## 2024-12-11 PROCEDURE — 93306 TTE W/DOPPLER COMPLETE: CPT

## 2024-12-11 PROCEDURE — 93306 TTE W/DOPPLER COMPLETE: CPT | Mod: 26,,, | Performed by: INTERNAL MEDICINE

## 2024-12-12 LAB
APICAL FOUR CHAMBER EJECTION FRACTION: 65 %
APICAL TWO CHAMBER EJECTION FRACTION: 53 %
ASCENDING AORTA: 3.45 CM
AV INDEX (PROSTH): 0.75
AV MEAN GRADIENT: 6.3 MMHG
AV PEAK GRADIENT: 10.2 MMHG
AV VALVE AREA BY VELOCITY RATIO: 2.6 CM²
AV VALVE AREA: 2.9 CM²
AV VELOCITY RATIO: 0.69
BSA FOR ECHO PROCEDURE: 1.78 M2
CV ECHO LV RWT: 0.41 CM
DOP CALC AO PEAK VEL: 1.6 M/S
DOP CALC AO VTI: 27.3 CM
DOP CALC LVOT AREA: 3.8 CM2
DOP CALC LVOT DIAMETER: 2.2 CM
DOP CALC LVOT PEAK VEL: 1.1 M/S
DOP CALC LVOT STROKE VOLUME: 78.3 CM3
DOP CALC MV VTI: 13 CM
DOP CALCLVOT PEAK VEL VTI: 20.6 CM
E WAVE DECELERATION TIME: 219.53 MSEC
E/A RATIO: 0.7
E/E' RATIO: 5.22 M/S
ECHO LV POSTERIOR WALL: 0.8 CM (ref 0.6–1.1)
FRACTIONAL SHORTENING: 28.2 % (ref 28–44)
HR MV ECHO: 87 BPM
INTERVENTRICULAR SEPTUM: 1 CM (ref 0.6–1.1)
IVC DIAMETER: 1.85 CM
LEFT ATRIUM AREA SYSTOLIC (APICAL 2 CHAMBER): 15.4 CM2
LEFT ATRIUM AREA SYSTOLIC (APICAL 4 CHAMBER): 15.73 CM2
LEFT ATRIUM VOLUME INDEX MOD: 21.4 ML/M2
LEFT ATRIUM VOLUME MOD: 37.96 ML
LEFT INTERNAL DIMENSION IN SYSTOLE: 2.8 CM (ref 2.1–4)
LEFT VENTRICLE DIASTOLIC VOLUME INDEX: 39.9 ML/M2
LEFT VENTRICLE DIASTOLIC VOLUME: 70.62 ML
LEFT VENTRICLE END DIASTOLIC VOLUME APICAL 2 CHAMBER: 46.41 ML
LEFT VENTRICLE END DIASTOLIC VOLUME APICAL 4 CHAMBER: 70.05 ML
LEFT VENTRICLE END SYSTOLIC VOLUME APICAL 2 CHAMBER: 37.6 ML
LEFT VENTRICLE END SYSTOLIC VOLUME APICAL 4 CHAMBER: 38.89 ML
LEFT VENTRICLE MASS INDEX: 59.5 G/M2
LEFT VENTRICLE SYSTOLIC VOLUME INDEX: 14.2 ML/M2
LEFT VENTRICLE SYSTOLIC VOLUME: 25.19 ML
LEFT VENTRICULAR INTERNAL DIMENSION IN DIASTOLE: 3.9 CM (ref 3.5–6)
LEFT VENTRICULAR MASS: 105.3 G
LV LATERAL E/E' RATIO: 3.92 M/S
LV SEPTAL E/E' RATIO: 7.83 M/S
LVED V (TEICH): 70.62 ML
LVES V (TEICH): 25.19 ML
LVOT MG: 2.58 MMHG
LVOT MV: 0.76 CM/S
MV A" WAVE DURATION": 106.57 MSEC
MV MEAN GRADIENT: 1 MMHG
MV PEAK A VEL: 0.67 M/S
MV PEAK E VEL: 0.47 M/S
MV PEAK GRADIENT: 2 MMHG
MV STENOSIS PRESSURE HALF TIME: 63.66 MS
MV VALVE AREA BY CONTINUITY EQUATION: 6.02 CM2
MV VALVE AREA P 1/2 METHOD: 3.46 CM2
OHS CV RV/LV RATIO: 0.92 CM
OHS LV EJECTION FRACTION SIMPSONS BIPLANE MOD: 59 %
PISA TR MAX VEL: 2.26 M/S
PULM VEIN S/D RATIO: 2.38
PV MV: 0.75 M/S
PV PEAK D VEL: 0.37 M/S
PV PEAK GRADIENT: 4 MMHG
PV PEAK S VEL: 0.88 M/S
PV PEAK VELOCITY: 0.97 M/S
RA PRESSURE ESTIMATED: 3 MMHG
RA VOL SYS: 48.64 ML
RIGHT ATRIAL AREA: 16.7 CM2
RIGHT ATRIUM VOLUME AREA LENGTH APICAL 4 CHAMBER: 44.41 ML
RIGHT VENTRICLE DIASTOLIC BASEL DIMENSION: 3.6 CM
RV TB RVSP: 5 MMHG
RV TISSUE DOPPLER FREE WALL SYSTOLIC VELOCITY 1 (APICAL 4 CHAMBER VIEW): 14.84 CM/S
SINUS: 3.53 CM
STJ: 3.35 CM
TDI LATERAL: 0.12 M/S
TDI SEPTAL: 0.06 M/S
TDI: 0.09 M/S
TR MAX PG: 20 MMHG
TRICUSPID ANNULAR PLANE SYSTOLIC EXCURSION: 1.96 CM
TV REST PULMONARY ARTERY PRESSURE: 23 MMHG
Z-SCORE OF LEFT VENTRICULAR DIMENSION IN END DIASTOLE: -2.27
Z-SCORE OF LEFT VENTRICULAR DIMENSION IN END SYSTOLE: -0.61

## 2024-12-23 ENCOUNTER — OFFICE VISIT (OUTPATIENT)
Dept: FAMILY MEDICINE | Facility: HOSPITAL | Age: 59
End: 2024-12-23
Payer: COMMERCIAL

## 2024-12-23 VITALS
DIASTOLIC BLOOD PRESSURE: 74 MMHG | HEART RATE: 104 BPM | WEIGHT: 148.56 LBS | SYSTOLIC BLOOD PRESSURE: 123 MMHG | OXYGEN SATURATION: 100 % | BODY MASS INDEX: 24.73 KG/M2

## 2024-12-23 DIAGNOSIS — E11.65 UNCONTROLLED TYPE 2 DIABETES MELLITUS WITH HYPERGLYCEMIA: ICD-10-CM

## 2024-12-23 DIAGNOSIS — E78.2 MIXED HYPERLIPIDEMIA: ICD-10-CM

## 2024-12-23 DIAGNOSIS — D73.5 SPLENIC INFARCT: ICD-10-CM

## 2024-12-23 DIAGNOSIS — I10 HYPERTENSION, UNSPECIFIED TYPE: ICD-10-CM

## 2024-12-23 DIAGNOSIS — D69.6 THROMBOCYTOPENIA: Primary | ICD-10-CM

## 2024-12-23 PROCEDURE — 99213 OFFICE O/P EST LOW 20 MIN: CPT

## 2024-12-23 RX ORDER — METFORMIN HYDROCHLORIDE 500 MG/1
500 TABLET ORAL 2 TIMES DAILY WITH MEALS
Qty: 180 TABLET | Refills: 1 | Status: SHIPPED | OUTPATIENT
Start: 2024-12-23 | End: 2025-12-23

## 2024-12-23 NOTE — PROGRESS NOTES
Progress Note  [facility]    Subjective:      Kris Cordon is a 59 y.o. male here for check up of DM2, HTN, HLD, thrombocytopenia        #HTN  - controlled on amlodipine 5 mg, olmesartan 20 mg. On statin, No CP, palpitations, headache    #DM2  - doing well with diet, currently only using jardiance 10 mg as he ran out of metformin. Sent rx for ozempic but did not use as he did not know how to use injection       #Thrombocytopenia   #Splenic infarct.   - Denies bruising, prolonged bleeding when shaving, recurrence of abdominal pain. Has appt with heme/onc next month     Active Problem List with Overview Notes    Diagnosis Date Noted    Hypertension 11/22/2024    Hyperlipidemia 11/22/2024    Uncontrolled type 2 diabetes mellitus with hyperglycemia 11/22/2024    Splenic infarct 11/14/2024      Review of Systems   All other systems reviewed and are negative.        Objective:   /74 (BP Location: Left arm)   Pulse 104   Wt 67.4 kg (148 lb 9.4 oz)   SpO2 100%   BMI 24.73 kg/m²      Physical Exam  Vitals and nursing note reviewed.   Constitutional:       Appearance: Normal appearance.   HENT:      Head: Normocephalic and atraumatic.   Eyes:      Extraocular Movements: Extraocular movements intact.      Pupils: Pupils are equal, round, and reactive to light.   Cardiovascular:      Rate and Rhythm: Normal rate and regular rhythm.   Pulmonary:      Effort: Pulmonary effort is normal.      Breath sounds: Normal breath sounds.   Skin:     Coloration: Skin is not pale.      Findings: No bruising, lesion or rash.   Neurological:      Mental Status: He is alert and oriented to person, place, and time.   Psychiatric:         Mood and Affect: Mood normal.         Behavior: Behavior normal.          Assessment:   59 y.o. with        1. Thrombocytopenia    2. Uncontrolled type 2 diabetes mellitus with hyperglycemia    3. Mixed hyperlipidemia    4. Splenic infarct    5. Hypertension, unspecified type          Plan:   Kris was seen today for follow-up.    Diagnoses and all orders for this visit:    Thrombocytopenia  - platelet count 42 on last CBC, no S/S of active bleeding. Will recheck today. Follow up with heme/onc for splenic infarct on CT in October.   -     CBC W/ AUTO DIFFERENTIAL; Future    Uncontrolled type 2 diabetes mellitus with hyperglycemia  - uncontrolled, will refill metformin, patient given ozempic injection by nursing today. Will return next week for nursing visit for injection until he is comfortable performing on his own. Recheck A1c in 2 months   -     Hemoglobin A1C; Future  -     metFORMIN (GLUCOPHAGE) 500 MG tablet; Take 1 tablet (500 mg total) by mouth 2 (two) times daily with meals.  -     empagliflozin (JARDIANCE) 10 mg tablet; Take 1 tablet (10 mg total) by mouth once daily.    Mixed hyperlipidemia  - uncontrolled, on statin. Recheck in 2 months   -     Lipid Panel; Future    Splenic infarct  - scheduled appt with heme/onc next month    Hypertension, unspecified type  - controlled, due for Cr, K check   -     COMPREHENSIVE METABOLIC PANEL; Future        Follow up in 2 months for DM2, HTN, HLD. A1c, lipid panel ordered and to be completed prior to next appt.     Shakir Clark DO  [facility], PGY-3  10:42 AM, 12/23/2024

## 2025-01-06 ENCOUNTER — HOSPITAL ENCOUNTER (OUTPATIENT)
Dept: CARDIOLOGY | Facility: HOSPITAL | Age: 60
Discharge: HOME | End: 2025-01-06
Payer: COMMERCIAL

## 2025-01-06 DIAGNOSIS — I10 HYPERTENSION, UNSPECIFIED TYPE: ICD-10-CM

## 2025-01-06 DIAGNOSIS — D73.5 SPLENIC INFARCT: ICD-10-CM

## 2025-01-06 PROCEDURE — 93226 XTRNL ECG REC<48 HR SCAN A/R: CPT

## 2025-01-07 ENCOUNTER — CLINICAL SUPPORT (OUTPATIENT)
Dept: DIABETES | Facility: CLINIC | Age: 60
End: 2025-01-07
Payer: COMMERCIAL

## 2025-01-07 DIAGNOSIS — E11.65 UNCONTROLLED TYPE 2 DIABETES MELLITUS WITH HYPERGLYCEMIA: ICD-10-CM

## 2025-01-07 PROCEDURE — G0108 DIAB MANAGE TRN  PER INDIV: HCPCS | Mod: S$GLB,,, | Performed by: DIETITIAN, REGISTERED

## 2025-01-07 PROCEDURE — 99999 PR PBB SHADOW E&M-EST. PATIENT-LVL I: CPT | Mod: PBBFAC,,, | Performed by: DIETITIAN, REGISTERED

## 2025-01-07 NOTE — PROGRESS NOTES
Diabetes Care Specialist Progress Note  Author: Toya Morrissey RD, CDE  Date: 1/7/2025    Intake    Program Intake  Reason for Diabetes Program Visit:: Initial Diabetes Assessment  Current diabetes risk level:: high  In the last month, have you used the ER or been admitted to the hospital: No    Current Diabetes Treatment: DM Injectables, Oral Medications  Oral Medication Type/Dose: Metformin and Jardiance  DM Injectables Type/Dose: Ozempic    Continuous Glucose Monitoring  Patient has CGM: No    Lab Results   Component Value Date    HGBA1C 11.8 (H) 11/19/2024       Lifestyle Coping Support & Clinical    Lifestyle/Coping/Support  Does anyone in your family have diabetes or does anyone in your family support you in your diabetes care?: Yes  How do you deal with stress/distress?: Support from family/friends  Learning Barriers:: None  Culture or Nondenominational beliefs that may impact ability to access healthcare: No  Psychosocial/Coping Skills Assessment Completed: : Yes  Assessment indicates:: Adequate understanding  Area of need?: No    Problem Review  Active Comorbidities: Hypertension    Diabetes Self-Management Skills Assessment    Medication Skills Assessment  Patient is able to identify current diabetes medications, dosages, and appropriate timing of medications.: yes  Patient reports problems or concerns with current medication regimen.: no  Patient is  aware that some diabetes medications can cause low blood sugar?: No  Medication Skills Assessment Completed:: Yes  Assessment indicates:: Instruction Needed  Area of need?: Yes    Diabetes Disease Process/Treatment Options  Diabetes Type?: Type II  When were you diagnosed?: 2024  What are your goals for this education session?: To learn how to manage DM  Is patient aware of what causes diabetes?: No  Does patient understand the pathophysiology of diabetes?: No  Diabetes Disease Process/Treatment Options: Skills Assessment Completed: Yes  Assessment indicates::  Instruction Needed  Area of need?: Yes    Nutrition/Healthy Eating - 3 meals; denies snacks; drinks mostly water  Patient can identify foods that impact blood sugar.: yes  Nutrition/Healthy Eating Skills Assessment Completed:: Yes  Assessment indicates:: Instruction Needed  Area of need?: Yes    Physical Activity/Exercise  Patient's daily activity level:: constantly moving (Does a lot of walking at work)  Patient can identify forms of physical activity.: yes  Physical Activity/Exercise Skills Assessment Completed: : Yes  Assessment indicates:: Instruction Needed  Area of need?: Yes    Home Blood Glucose Monitoring  Patient states that blood sugar is checked at home daily.: yes  Monitoring Method:: home glucometer  How often do you check your blood sugar?: 1-2 days a week  Home Blood Glucose Monitoring Skills Assessment Completed: : Yes  Assessment indicates:: Instruction Needed  Area of need?: Yes    Acute Complications  Have you ever had hypoglycemia (low BG 70 or less)?: no  Have you ever had hyperglycemia (high  or more)?: yes  Acute Complications Skills Assessment Completed: : Yes  Assessment indicates:: Instruction Needed  Area of need?: Yes    Chronic Complications  Reviewed health maintenance: yes  Have you completed your annual diabetes maintenance labwork? : yes  Do you examine your feet daily?: no  Has your doctor examined your feet?: no  Do you see a Dentist?: no  Do you see an eye doctor?: no (Scheduled in March)  Chronic Complications Skills Assessment Completed: : Yes  Assessment indicates:: Instruction Needed  Area of need?: Yes      Assessment Summary and Plan    Based on today's diabetes care assessment, the following areas of need were identified:      Identified Areas of Need      Medication/Current Diabetes Treatment: Yes - reviewed MOA of medications and regimen   Lifestyle Coping/Support: No   Diabetes Disease Process/Treatment Options: Yes - reviewed SMBG schedule and BG goals    Nutrition/Healthy Eating: Yes - reviewed CHO counting, label reading, and addt'l resources to assist w/ CHO counting; plate method reviewed; alternatives to sugary beverages discussed   Physical Activity/Exercise: Yes - reviewed goals/benefits   Home Blood Glucose Monitoring: Yes - reviewed SMBG schedule and BG goals   Acute Complications: Yes - reviewed s/s and treatment of hypo/hyperglycemia   Chronic Complications: Yes - reviewed standards of care       Today's interventions were provided through individual discussion, instruction, and written materials were provided.      Patient verbalized understanding of instruction and written materials.  Pt was able to return back demonstration of instructions today. Patient understood key points, needs reinforcement and further instruction.     Diabetes Self-Management Care Plan:    Today's Diabetes Self-Management Care Plan was developed with Kris's input. Kris has agreed to work toward the following goal(s) to improve his/her overall diabetes control.      Care Plan: Diabetes Management   Updates made since 1/8/2024 12:00 AM        Problem: Chronic Complications         Long-Range Goal: Patient to complete eye exam scheduled for March    Start Date: 1/7/2025   Expected End Date: 7/7/2025   Priority: High   Barriers: No Barriers Identified        Task: Discussed importance of annual dilated eye exam for prevention of retinopathy. Completed 1/7/2025         Follow Up Plan     Follow up in about 6 months (around 7/7/2025).    Today's care plan and follow up schedule was discussed with patient.  Kris verbalized understanding of the care plan, goals, and agrees to follow up plan.        The patient was encouraged to communicate with his/her health care provider/physician and care team regarding his/her condition(s) and treatment.  I provided the patient with my contact information today and encouraged to contact me via phone or [facility]'s Patient Portal as needed.      Length of Visit   Total Time: 60 Minutes

## 2025-01-28 ENCOUNTER — CLINICAL SUPPORT (OUTPATIENT)
Dept: ENDOSCOPY | Facility: HOSPITAL | Age: 60
End: 2025-01-28
Payer: COMMERCIAL

## 2025-01-28 ENCOUNTER — TELEPHONE (OUTPATIENT)
Dept: ENDOSCOPY | Facility: HOSPITAL | Age: 60
End: 2025-01-28

## 2025-01-28 VITALS — WEIGHT: 160 LBS | BODY MASS INDEX: 26.66 KG/M2 | HEIGHT: 65 IN

## 2025-01-28 DIAGNOSIS — Z12.11 ENCOUNTER FOR SCREENING FOR MALIGNANT NEOPLASM OF COLON: ICD-10-CM

## 2025-01-28 DIAGNOSIS — Z12.11 SPECIAL SCREENING FOR MALIGNANT NEOPLASMS, COLON: Primary | ICD-10-CM

## 2025-01-28 RX ORDER — POLYETHYLENE GLYCOL 3350, SODIUM SULFATE ANHYDROUS, SODIUM BICARBONATE, SODIUM CHLORIDE, POTASSIUM CHLORIDE 236; 22.74; 6.74; 5.86; 2.97 G/4L; G/4L; G/4L; G/4L; G/4L
4 POWDER, FOR SOLUTION ORAL ONCE
Qty: 4000 ML | Refills: 0 | Status: SHIPPED | OUTPATIENT
Start: 2025-01-28 | End: 2025-01-28

## 2025-01-28 NOTE — TELEPHONE ENCOUNTER
Referral for procedure from PAT appointment      Spoke to patient to schedule procedure(s) Colonoscopy       Physician to perform procedure(s) Dr. CORINE Sethi  Date of Procedure (s) 2/28/25  Arrival Time 8:15 AM  Time of Procedure(s) 9:15 AM   Location of Procedure(s) [facility] [address]  Type of Rx Prep sent to patient: PEG  Instructions provided to patient via Email    Patient was informed on the following information and verbalized understanding. Screening questionnaire reviewed with patient and complete. If procedure requires anesthesia, a responsible adult needs to be present to accompany the patient home, patient cannot drive after receiving anesthesia. Appointment details are tentative, especially check-in time. Patient will receive a prep-op call 7 days prior to confirm check-in time for procedure. If applicable the patient should contact their pharmacy to verify Rx for procedure prep is ready for pick-up. Patient was advised to call the scheduling department at [phone] if pharmacy states no Rx is available. Patient was advised to call the endoscopy scheduling department if any questions or concerns arise.      [facility] Scheduling Department

## 2025-01-31 ENCOUNTER — OFFICE VISIT (OUTPATIENT)
Dept: FAMILY MEDICINE | Facility: HOSPITAL | Age: 60
End: 2025-01-31
Payer: COMMERCIAL

## 2025-01-31 VITALS
HEART RATE: 93 BPM | BODY MASS INDEX: 25.16 KG/M2 | WEIGHT: 151 LBS | SYSTOLIC BLOOD PRESSURE: 137 MMHG | HEIGHT: 65 IN | DIASTOLIC BLOOD PRESSURE: 87 MMHG | OXYGEN SATURATION: 100 %

## 2025-01-31 DIAGNOSIS — E78.5 HYPERLIPIDEMIA, UNSPECIFIED HYPERLIPIDEMIA TYPE: Primary | ICD-10-CM

## 2025-01-31 DIAGNOSIS — I10 HYPERTENSION, UNSPECIFIED TYPE: ICD-10-CM

## 2025-01-31 DIAGNOSIS — D73.5 SPLENIC INFARCT: ICD-10-CM

## 2025-01-31 DIAGNOSIS — E11.65 UNCONTROLLED TYPE 2 DIABETES MELLITUS WITH HYPERGLYCEMIA: ICD-10-CM

## 2025-01-31 PROCEDURE — 99214 OFFICE O/P EST MOD 30 MIN: CPT

## 2025-01-31 RX ORDER — AMLODIPINE BESYLATE 5 MG/1
5 TABLET ORAL DAILY
Qty: 90 TABLET | Refills: 3 | Status: SHIPPED | OUTPATIENT
Start: 2025-01-31 | End: 2026-01-31

## 2025-01-31 RX ORDER — SEMAGLUTIDE 0.68 MG/ML
0.5 INJECTION, SOLUTION SUBCUTANEOUS
Qty: 3 ML | Refills: 0 | Status: SHIPPED | OUTPATIENT
Start: 2025-02-24

## 2025-01-31 RX ORDER — POLYETHYLENE GLYCOL-3350 AND ELECTROLYTES 236; 6.74; 5.86; 2.97; 22.74 G/274.31G; G/274.31G; G/274.31G; G/274.31G; G/274.31G
POWDER, FOR SOLUTION ORAL
COMMUNITY
Start: 2025-01-28

## 2025-01-31 RX ORDER — OLMESARTAN MEDOXOMIL 20 MG/1
20 TABLET ORAL DAILY
Qty: 90 TABLET | Refills: 3 | Status: SHIPPED | OUTPATIENT
Start: 2025-01-31 | End: 2026-01-31
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
## 2025-02-05 ENCOUNTER — CLINICAL SUPPORT (OUTPATIENT)
Dept: FAMILY MEDICINE | Facility: HOSPITAL | Age: 60
End: 2025-02-05
Payer: COMMERCIAL

## 2025-02-05 DIAGNOSIS — E11.65 UNCONTROLLED TYPE 2 DIABETES MELLITUS WITH HYPERGLYCEMIA: Primary | ICD-10-CM

## 2025-02-05 PROCEDURE — 99211 OFF/OP EST MAY X REQ PHY/QHP: CPT

## 2025-02-05 NOTE — PROGRESS NOTES
Patient arrived for Ozempic injection teaching with pen for 0.25mg dose. Instructed client on preparation of dose pen prior to injection . Verbalized understanding of inst given. Client self injected 0.25mg dose without anxiety and properly done. Demonstrated ability to self inject in the future.-DP

## 2025-02-11 NOTE — PROGRESS NOTES
I assume primary medical responsibility for this patient. I have reviewed the history, physical, and assessement & treatment plan with the resident and agree that the care is reasonable and necessary. This service has been performed by a resident without the presence of a teaching physician under the primary care exception. If necessary, an addendum of additional findings or evaluation beyond the resident documentation will be noted below.    Agree with plan however based on likely poor BG control and BP not at goal of <130/80 will have patient scheduled in clinic in the coming month for further review.

## 2025-02-19 ENCOUNTER — ANESTHESIA EVENT (OUTPATIENT)
Dept: ENDOSCOPY | Facility: HOSPITAL | Age: 60
End: 2025-02-19
Payer: COMMERCIAL

## 2025-02-27 ENCOUNTER — TELEPHONE (OUTPATIENT)
Dept: ENDOSCOPY | Facility: HOSPITAL | Age: 60
End: 2025-02-27
Payer: COMMERCIAL

## 2025-02-27 ENCOUNTER — PATIENT MESSAGE (OUTPATIENT)
Dept: ENDOSCOPY | Facility: HOSPITAL | Age: 60
End: 2025-02-27
Payer: COMMERCIAL

## 2025-02-27 NOTE — TELEPHONE ENCOUNTER
Pt called for instructions for colonoscopy on 2/28/25.during call pt stated ate lunch and did not hold jardiance. Pt re-scheduled    Referral for procedure from PAT [id number]      Spoke to pt to schedule procedure(s) Colonoscopy       Physician to perform procedure(s) Dr. BRANDEN Zhou  Date of Procedure (s) 3/14/25  Arrival Time 12:00 PM  Time of Procedure(s) 1:00 PM   Location of Procedure(s) [facility]  Type of Rx Prep sent to patient: PEG  Instructions provided to patient via [facility]    Patient was informed on the following information and verbalized understanding. Screening questionnaire reviewed with patient and complete. If procedure requires anesthesia, a responsible adult needs to be present to accompany the patient home, patient cannot drive after receiving anesthesia. Appointment details are tentative, especially check-in time. Patient will receive a prep-op call 7 days prior to confirm check-in time for procedure. If applicable the patient should contact their pharmacy to verify Rx for procedure prep is ready for pick-up. Patient was advised to call the scheduling department at [phone] if pharmacy states no Rx is available. Patient was advised to call the endoscopy scheduling department if any questions or concerns arise.      [facility] Scheduling Department

## 2025-02-27 NOTE — TELEPHONE ENCOUNTER
Called to confirm appt on 2.28; pt did not answer. Lvm with callback number. Portal msg sent   No complaints

## 2025-02-28 ENCOUNTER — ANESTHESIA (OUTPATIENT)
Dept: ENDOSCOPY | Facility: HOSPITAL | Age: 60
End: 2025-02-28
Payer: COMMERCIAL

## 2025-03-12 ENCOUNTER — TELEPHONE (OUTPATIENT)
Dept: GASTROENTEROLOGY | Facility: CLINIC | Age: 60
End: 2025-03-12
Payer: COMMERCIAL

## 2025-03-12 NOTE — TELEPHONE ENCOUNTER
----- Message from [facility] sent at 3/12/2025  3:40 PM CDT -----  Type:  Reschedule Appointment Request Name of Caller:ptWhen is the first available appointment?No accessSymptoms:colonoscopyWould the patient rather a call back or a response via [facility]? Call [place] Call Back Number:[phone] Additional Information: pt would like to have the procedure rescheduled to the second week in May if poss.

## 2025-03-13 ENCOUNTER — PATIENT MESSAGE (OUTPATIENT)
Dept: ENDOSCOPY | Facility: HOSPITAL | Age: 60
End: 2025-03-13
Payer: COMMERCIAL

## 2025-03-18 ENCOUNTER — OFFICE VISIT (OUTPATIENT)
Dept: OPTOMETRY | Facility: CLINIC | Age: 60
End: 2025-03-18
Payer: COMMERCIAL

## 2025-03-18 DIAGNOSIS — H52.4 PRESBYOPIA: ICD-10-CM

## 2025-03-18 DIAGNOSIS — Z01.00 ROUTINE EYE EXAM: Primary | ICD-10-CM

## 2025-03-18 DIAGNOSIS — E11.9 TYPE 2 DIABETES MELLITUS WITHOUT RETINOPATHY: ICD-10-CM

## 2025-03-18 DIAGNOSIS — R73.9 HYPERGLYCEMIA: ICD-10-CM

## 2025-03-18 PROCEDURE — 92015 DETERMINE REFRACTIVE STATE: CPT | Mod: S$GLB,,, | Performed by: OPTOMETRIST

## 2025-03-18 PROCEDURE — 92004 COMPRE OPH EXAM NEW PT 1/>: CPT | Mod: S$GLB,,, | Performed by: OPTOMETRIST

## 2025-03-18 PROCEDURE — 99999 PR PBB SHADOW E&M-EST. PATIENT-LVL III: CPT | Mod: PBBFAC,,, | Performed by: OPTOMETRIST

## 2025-03-18 NOTE — PROGRESS NOTES
HPI     Diabetic Eye Exam            Comments: Patient Arash Cordon is a 60 year old male.          Comments    Pt here for new patient diabetic eye exam. Pt states that he was told that   he had elevated BS levels during ED visit in October-November 2024, checks   BS levels twice daily. Pt states that he uses OTC +1.25 to +1.50 readers    for near. Pt states that he has itchy, watery on occasion from allergies.   Patient denies diplopia, headaches, flashes/floaters, and pain.    WAN: 20+ years ago  PD: 62.5 mm    Meds: Allergy gtts PRN OU    POHx: None    FOHx: None    (+)DM LBS: 196 this A.M.  Hemoglobin A1C       Date                     Value               Ref Range             Status                01/31/2025               9.3 (H)             4.0 - 5.6 %           Final                       11/19/2024               11.8 (H)            4.0 - 5.6 %           Final          Last edited by Crissy Conway on 3/18/2025  2:50 PM.            Assessment /Plan     For exam results, see Encounter Report.    Routine eye exam    Hyperglycemia  -     Ambulatory referral/consult to Ophthalmology    Type 2 diabetes mellitus without retinopathy    Presbyopia      Pt happy w otc readers  DM- WITHOUT RETINOPATHY.  Advised yearly DFE    PLAN:    Rtc 1 yr

## 2025-05-15 ENCOUNTER — TELEPHONE (OUTPATIENT)
Dept: ENDOSCOPY | Facility: HOSPITAL | Age: 60
End: 2025-05-15
Payer: COMMERCIAL

## 2025-05-15 NOTE — TELEPHONE ENCOUNTER
Patient is rescheduled for a Colonoscopy on 6 /6/25 with Dr. BRANDEN Zhou  Referral for procedure from Telephone call  to r/s originally scheduled from pat

## 2025-06-04 ENCOUNTER — TELEPHONE (OUTPATIENT)
Dept: ENDOSCOPY | Facility: HOSPITAL | Age: 60
End: 2025-06-04
Payer: COMMERCIAL

## 2025-06-06 ENCOUNTER — HOSPITAL ENCOUNTER (OUTPATIENT)
Facility: HOSPITAL | Age: 60
Discharge: HOME | End: 2025-06-06
Attending: INTERNAL MEDICINE | Admitting: INTERNAL MEDICINE
Payer: COMMERCIAL

## 2025-06-06 VITALS
WEIGHT: 160 LBS | SYSTOLIC BLOOD PRESSURE: 132 MMHG | HEIGHT: 65 IN | TEMPERATURE: 98 F | DIASTOLIC BLOOD PRESSURE: 83 MMHG | BODY MASS INDEX: 26.66 KG/M2 | RESPIRATION RATE: 22 BRPM | HEART RATE: 69 BPM | OXYGEN SATURATION: 99 %

## 2025-06-06 DIAGNOSIS — Z12.11 SCREEN FOR COLON CANCER: ICD-10-CM

## 2025-06-06 DIAGNOSIS — Z12.11 ENCOUNTER FOR SCREENING FOR MALIGNANT NEOPLASM OF COLON: ICD-10-CM

## 2025-06-06 DIAGNOSIS — Z12.11 COLON CANCER SCREENING: Primary | ICD-10-CM

## 2025-06-06 LAB — POCT GLUCOSE: 214 MG/DL (ref 70–110)

## 2025-06-06 PROCEDURE — 25000003 PHARM REV CODE 250: Performed by: INTERNAL MEDICINE

## 2025-06-06 PROCEDURE — 45385 COLONOSCOPY W/LESION REMOVAL: CPT | Mod: 33,,, | Performed by: INTERNAL MEDICINE

## 2025-06-06 PROCEDURE — 25000003 PHARM REV CODE 250: Performed by: NURSE ANESTHETIST, CERTIFIED REGISTERED

## 2025-06-06 PROCEDURE — 94761 N-INVAS EAR/PLS OXIMETRY MLT: CPT

## 2025-06-06 PROCEDURE — 37000009 HC ANESTHESIA EA ADD 15 MINS: Performed by: INTERNAL MEDICINE

## 2025-06-06 PROCEDURE — 88305 TISSUE EXAM BY PATHOLOGIST: CPT | Mod: TC | Performed by: INTERNAL MEDICINE

## 2025-06-06 PROCEDURE — 82962 GLUCOSE BLOOD TEST: CPT | Performed by: INTERNAL MEDICINE

## 2025-06-06 PROCEDURE — 27201089 HC SNARE, DISP (ANY): Performed by: INTERNAL MEDICINE

## 2025-06-06 PROCEDURE — 37000008 HC ANESTHESIA 1ST 15 MINUTES: Performed by: INTERNAL MEDICINE

## 2025-06-06 PROCEDURE — 63600175 PHARM REV CODE 636 W HCPCS: Performed by: NURSE ANESTHETIST, CERTIFIED REGISTERED

## 2025-06-06 PROCEDURE — 45385 COLONOSCOPY W/LESION REMOVAL: CPT | Mod: 33 | Performed by: INTERNAL MEDICINE

## 2025-06-06 PROCEDURE — 99900035 HC TECH TIME PER 15 MIN (STAT)

## 2025-06-06 RX ORDER — PROPOFOL 10 MG/ML
VIAL (ML) INTRAVENOUS CONTINUOUS PRN
Status: DISCONTINUED | OUTPATIENT
Start: 2025-06-06 | End: 2025-06-06

## 2025-06-06 RX ORDER — SODIUM CHLORIDE 9 MG/ML
INJECTION, SOLUTION INTRAVENOUS CONTINUOUS
Status: DISCONTINUED | OUTPATIENT
Start: 2025-06-06 | End: 2025-06-06 | Stop reason: HOSPADM

## 2025-06-06 RX ORDER — LIDOCAINE HYDROCHLORIDE 20 MG/ML
INJECTION INTRAVENOUS
Status: DISCONTINUED | OUTPATIENT
Start: 2025-06-06 | End: 2025-06-06

## 2025-06-06 RX ADMIN — PROPOFOL 100 MG: 10 INJECTION, EMULSION INTRAVENOUS at 01:06

## 2025-06-06 RX ADMIN — SODIUM CHLORIDE: 9 INJECTION, SOLUTION INTRAVENOUS at 12:06

## 2025-06-06 RX ADMIN — PROPOFOL 165 MCG/KG/MIN: 10 INJECTION, EMULSION INTRAVENOUS at 01:06

## 2025-06-06 RX ADMIN — SODIUM CHLORIDE: 0.9 INJECTION, SOLUTION INTRAVENOUS at 01:06

## 2025-06-06 RX ADMIN — LIDOCAINE HYDROCHLORIDE 20 MG: 20 INJECTION INTRAVENOUS at 01:06

## 2025-06-06 NOTE — PROVATION PATIENT INSTRUCTIONS
Discharge Summary/Instructions after an Endoscopic Procedure  Patient Name: Kris Cordon  Patient MRN: [id number]  Patient YOB: 1965 Friday, June 6, 2025  Jamie Zhou MD  Dear patient,  As a result of recent federal legislation (The Federal Cures Act), you may   receive lab or pathology results from your procedure in your [facility]   account before your physician is able to contact you. Your physician or   their representative will relay the results to you with their   recommendations at their soonest availability.  Thank you,  RESTRICTIONS:  During your procedure today, you received medications for sedation.  These   medications may affect your judgment, balance and coordination.  Therefore,   for 24 hours, you have the following restrictions:   - DO NOT drive a car, operate machinery, make legal/financial decisions,   sign important papers or drink alcohol.    ACTIVITY:  Today: no heavy lifting, straining or running due to procedural   sedation/anesthesia.  The following day: return to full activity including work.  DIET:  Eat and drink normally unless instructed otherwise.     TREATMENT FOR COMMON SIDE EFFECTS:  - Mild abdominal pain, nausea, belching, bloating or excessive gas:  rest,   eat lightly and use a heating pad.  - Sore Throat: treat with throat lozenges and/or gargle with warm salt   water.  - Because air was used during the procedure, expelling large amounts of air   from your rectum or belching is normal.  - If a bowel prep was taken, you may not have a bowel movement for 1-3 days.    This is normal.  SYMPTOMS TO WATCH FOR AND REPORT TO YOUR PHYSICIAN:  1. Abdominal pain or bloating, other than gas cramps.  2. Chest pain.  3. Back pain.  4. Signs of infection such as: chills or fever occurring within 24 hours   after the procedure.  5. Rectal bleeding, which would show as bright red, maroon, or black stools.   (A tablespoon of blood from the rectum is not serious, especially if    hemorrhoids are present.)  6. Vomiting.  7. Weakness or dizziness.  GO DIRECTLY TO THE NEAREST EMERGENCY ROOM IF YOU HAVE ANY OF THE FOLLOWING:      Difficulty breathing              Chills and/or fever over 101 F   Persistent vomiting and/or vomiting blood   Severe abdominal pain   Severe chest pain   Black, tarry stools   Bleeding- more than one tablespoon   Any other symptom or condition that you feel may need urgent attention  Your doctor recommends these additional instructions:  If any biopsies were taken, your doctors clinic will contact you in 1 to 2   weeks with any results.  - Patient has a contact number available for emergencies.  The signs and   symptoms of potential delayed complications were discussed with the   patient.  Return to normal activities tomorrow.  Written discharge   instructions were provided to the patient.   - Discharge patient to home.   - Resume previous diet.   - Continue present medications.   - Await pathology results.   - Repeat colonoscopy in 5 years for surveillance.   For questions, problems or results please call your physician - Jamie Zhou MD at Work:  [phone].  [facility], EMERGENCY ROOM PHONE NUMBER: [phone]  IF A COMPLICATION OR EMERGENCY SITUATION ARISES AND YOU ARE UNABLE TO REACH   YOUR PHYSICIAN - GO DIRECTLY TO THE EMERGENCY ROOM.  Jmaie Zhou MD  6/6/2025 1:44:47 PM  This report has been verified and signed electronically.  Dear patient,  As a result of recent federal legislation (The Federal Cures Act), you may   receive lab or pathology results from your procedure in your [facility]   account before your physician is able to contact you. Your physician or   their representative will relay the results to you with their   recommendations at their soonest availability.  Thank you,  PROVATION

## 2025-06-06 NOTE — PLAN OF CARE
Discharge instructions reviewed with patient.  Pt verbalizes understanding. Questions and concerns addressed. PIV removed with catheter intact.  Patient denies any acute pain or discomfort. Escorted to vehicle via wheelchair.

## 2025-06-06 NOTE — H&P
Short Stay Endoscopy History and Physical    PCP - Shakir Clark DO    Procedure - Colonoscopy  Sedation: GA  ASA - per anesthesia  Mallampati - per anesthesia  History of Anesthesia problems - no  Family history Anesthesia problems -  no     HPI:  This is a 60 y.o. male here for evaluation of : Screening for CRC    Reflux - no  Dysphagia - no  Abdominal pain - no  Diarrhea - no    ROS:  Constitutional: No fevers, chills, No weight loss  ENT: No allergies  CV: No chest pain  Pulm: No cough, No shortness of breath  Ophtho: No vision changes  GI: see HPI  Medical History:  has a past medical history of Hypertension.    Surgical History:  has no past surgical history on file.    Family History: family history is not on file.. Otherwise no colon cancer, inflammatory bowel disease, or GI malignancies.    Social History:  reports that he has never smoked. He has never used smokeless tobacco.    Review of patient's allergies indicates:   Allergen Reactions    Ibuprofen        Medications:   Prescriptions Prior to Admission[1]    Objective Findings:    Vital Signs: Per nursing notes.    Physical Exam:  General Appearance: Well appearing in no acute distress  Head:   Normocephalic, without obvious abnormality  Eyes:    No scleral icterus  Airway: Open  Neck: No restriction in mobility  Lungs: CTA bilaterally in anterior and posterior fields, no wheezes, no crackles.  Heart:  Regular rate and rhythm, S1, S2 normal, no murmurs heard  Abdomen: Soft, non tender, non distended      Labs:  Lab Results   Component Value Date    WBC 6.51 01/31/2025    HGB 16.0 01/31/2025    HCT 48.2 01/31/2025     01/31/2025    CHOL 213 (H) 01/31/2025    TRIG 86 01/31/2025    HDL 51 01/31/2025    ALT 31 01/31/2025    AST 22 01/31/2025     01/31/2025    K 3.9 01/31/2025     01/31/2025    CREATININE 0.9 01/31/2025    BUN 22 (H) 01/31/2025    CO2 25 01/31/2025    TSH 1.460 11/19/2024    HGBA1C 9.3 (H) 01/31/2025         I have  explained the risks and benefits of endoscopy procedures to the patient including but not limited to bleeding, perforation, infection, and death.    Thank you so much for allowing me to participate in the care of Kris Zhou MD         [1]   Medications Prior to Admission   Medication Sig Dispense Refill Last Dose/Taking    amLODIPine (NORVASC) 5 MG tablet Take 1 tablet (5 mg total) by mouth once daily. 90 tablet 3 6/6/2025    atorvastatin (LIPITOR) 40 MG tablet Take 1 tablet (40 mg total) by mouth once daily. 90 tablet 3 5/31/2025    blood sugar diagnostic Strp To check blood glucose twice daily 100 each 5     blood-glucose meter Misc Use as directed 1 each 0     empagliflozin (JARDIANCE) 10 mg tablet Take 1 tablet (10 mg total) by mouth once daily. 90 tablet 0 5/31/2025    GAVILYTE-G 236-22.74-6.74 -5.86 gram suspension SMARTSIG:Milliliter(s) By Mouth       lancets Misc To check Blood glucose twice daily 100 each 5     metFORMIN (GLUCOPHAGE) 500 MG tablet Take 1 tablet (500 mg total) by mouth 2 (two) times daily with meals. 180 tablet 1 5/31/2025    olmesartan (BENICAR) 20 MG tablet Take 1 tablet (20 mg total) by mouth once daily. 90 tablet 3 5/31/2025    semaglutide (OZEMPIC) 0.25 mg or 0.5 mg (2 mg/3 mL) pen injector Inject 0.5 mg into the skin every 7 days. 3 mL 0 5/12/2025

## 2025-06-09 ENCOUNTER — RESULTS FOLLOW-UP (OUTPATIENT)
Dept: GASTROENTEROLOGY | Facility: CLINIC | Age: 60
End: 2025-06-09

## 2025-06-09 LAB
ESTROGEN SERPL-MCNC: NORMAL PG/ML
INSULIN SERPL-ACNC: NORMAL U[IU]/ML
LAB AP CLINICAL INFORMATION: NORMAL
LAB AP GROSS DESCRIPTION: NORMAL
LAB AP PERFORMING LOCATION(S): NORMAL
LAB AP REPORT FOOTNOTES: NORMAL

## 2025-06-10 ENCOUNTER — TELEPHONE (OUTPATIENT)
Dept: GASTROENTEROLOGY | Facility: CLINIC | Age: 60
End: 2025-06-10
Payer: COMMERCIAL

## 2025-06-10 NOTE — TELEPHONE ENCOUNTER
----- Message from Jamie Zhou MD sent at 6/9/2025  9:58 PM CDT -----  Please call and notify patient, the colon polys was benign.    ----- Message -----  From: Lab, Background User  Sent: 6/9/2025   3:55 PM CDT  To: Jamie Zhou MD

## 2025-06-26 DIAGNOSIS — E11.65 UNCONTROLLED TYPE 2 DIABETES MELLITUS WITH HYPERGLYCEMIA: ICD-10-CM

## 2025-06-26 NOTE — TELEPHONE ENCOUNTER
Copied from CRM #[id number]. Topic: Medications - Medication Refill  >> Jun 26, 2025 12:33 PM Wander wrote:  Type: Requesting refill     Who Called: [id number] - [address] AT [address] [address]  Phone: [phone] Fax: [phone]  Hours: Not open 24 hours    Regarding:  metFORMIN (GLUCOPHAGE) 500 MG tablet   Would the patient rather a call back or a response via [facility]? Call back  Best Call Back Number: [phone]

## 2025-06-27 RX ORDER — METFORMIN HYDROCHLORIDE 500 MG/1
500 TABLET ORAL 2 TIMES DAILY WITH MEALS
Qty: 180 TABLET | Refills: 1 | Status: SHIPPED | OUTPATIENT
Start: 2025-06-27 | End: 2026-06-27

## 2025-07-09 DIAGNOSIS — E11.65 UNCONTROLLED TYPE 2 DIABETES MELLITUS WITH HYPERGLYCEMIA: ICD-10-CM

## 2025-07-11 DIAGNOSIS — E11.65 UNCONTROLLED TYPE 2 DIABETES MELLITUS WITH HYPERGLYCEMIA: ICD-10-CM
